# Patient Record
Sex: FEMALE | Race: BLACK OR AFRICAN AMERICAN | URBAN - METROPOLITAN AREA
[De-identification: names, ages, dates, MRNs, and addresses within clinical notes are randomized per-mention and may not be internally consistent; named-entity substitution may affect disease eponyms.]

---

## 2023-10-12 ENCOUNTER — CLAIMS CREATED FROM THE CLAIM WINDOW (OUTPATIENT)
Dept: URBAN - METROPOLITAN AREA CLINIC 105 | Facility: CLINIC | Age: 48
End: 2023-10-12
Payer: SELF-PAY

## 2023-10-12 ENCOUNTER — TELEPHONE ENCOUNTER (OUTPATIENT)
Dept: URBAN - METROPOLITAN AREA CLINIC 105 | Facility: CLINIC | Age: 48
End: 2023-10-12

## 2023-10-12 VITALS
HEART RATE: 98 BPM | BODY MASS INDEX: 32.47 KG/M2 | TEMPERATURE: 97.3 F | DIASTOLIC BLOOD PRESSURE: 79 MMHG | HEIGHT: 66 IN | SYSTOLIC BLOOD PRESSURE: 125 MMHG | WEIGHT: 202 LBS

## 2023-10-12 DIAGNOSIS — K57.92 DIVERTICULITIS: ICD-10-CM

## 2023-10-12 DIAGNOSIS — Z12.11 COLON CANCER SCREENING: ICD-10-CM

## 2023-10-12 PROCEDURE — 99203 OFFICE O/P NEW LOW 30 MIN: CPT

## 2023-10-12 PROCEDURE — 99203 OFFICE O/P NEW LOW 30 MIN: CPT | Performed by: INTERNAL MEDICINE

## 2023-10-12 RX ORDER — AMOXICILLIN 875 MG/1
1 TABLET TABLET, FILM COATED ORAL TWICE A DAY
Status: ACTIVE | COMMUNITY

## 2023-10-12 RX ORDER — CIPROFLOXACIN HYDROCHLORIDE 500 MG/1
1 TABLET TABLET, FILM COATED ORAL
Qty: 28 TABLET | Refills: 0 | OUTPATIENT
Start: 2023-10-12 | End: 2023-10-26

## 2023-10-12 RX ORDER — METRONIDAZOLE 500 MG/1
1 TABLET TABLET ORAL THREE TIMES A DAY
Qty: 42 TABLET | Refills: 0 | OUTPATIENT
Start: 2023-10-12 | End: 2023-10-26

## 2023-10-12 NOTE — HPI-TODAY'S VISIT:
New patient here with no PMH hx here as a hospital follow up for diverticulitis. Patient states this has never happened before. She has never had a colonosocpy before.  Went to [facility] ER 10/12/2023 for LLQ severe abdominal pain. Intermittment. Exacerbated while laying on left side and on back. Radiated to her back. Had nausea and diarrhea. Had 1-2 BMs that were soft. Denies vomiting Had LLQ tenderess at PCP office who recommended she go to the ER on 10/11/2023. Labs showed WBC 14.3. CT scan showed acute uncomplicated sigmoid diverticulitis.   Was discharged with Amoxicillin 875 mg PO BID. Patient states she could not take medicaiont becuse it was too big. Patient still having pain today. Same severeity. Still nauseous.   No prior history of constipation.

## 2023-10-17 ENCOUNTER — TELEPHONE ENCOUNTER (OUTPATIENT)
Dept: URBAN - METROPOLITAN AREA CLINIC 105 | Facility: CLINIC | Age: 48
End: 2023-10-17

## 2023-10-23 ENCOUNTER — CLAIMS CREATED FROM THE CLAIM WINDOW (OUTPATIENT)
Dept: URBAN - METROPOLITAN AREA CLINIC 105 | Facility: CLINIC | Age: 48
End: 2023-10-23
Payer: SELF-PAY

## 2023-10-23 VITALS
TEMPERATURE: 97.3 F | SYSTOLIC BLOOD PRESSURE: 126 MMHG | WEIGHT: 201.6 LBS | HEIGHT: 66 IN | BODY MASS INDEX: 32.4 KG/M2 | DIASTOLIC BLOOD PRESSURE: 88 MMHG | HEART RATE: 73 BPM

## 2023-10-23 DIAGNOSIS — Z12.11 COLON CANCER SCREENING: ICD-10-CM

## 2023-10-23 DIAGNOSIS — K57.92 DIVERTICULITIS: ICD-10-CM

## 2023-10-23 PROCEDURE — 99213 OFFICE O/P EST LOW 20 MIN: CPT

## 2023-10-23 PROCEDURE — 99213 OFFICE O/P EST LOW 20 MIN: CPT | Performed by: INTERNAL MEDICINE

## 2023-10-23 RX ORDER — CIPROFLOXACIN HYDROCHLORIDE 500 MG/1
1 TABLET TABLET, FILM COATED ORAL
Qty: 28 TABLET | Refills: 0 | Status: ACTIVE | COMMUNITY
Start: 2023-10-12 | End: 2023-10-26

## 2023-10-23 RX ORDER — METRONIDAZOLE 500 MG/1
1 TABLET TABLET ORAL THREE TIMES A DAY
Qty: 42 TABLET | Refills: 0 | Status: ACTIVE | COMMUNITY
Start: 2023-10-12 | End: 2023-10-26

## 2023-10-23 RX ORDER — AMOXICILLIN 875 MG/1
1 TABLET TABLET, FILM COATED ORAL TWICE A DAY
Status: ON HOLD | COMMUNITY

## 2023-10-23 NOTE — HPI-TODAY'S VISIT:
10/23/2023 Patient is here as a follow-up visit for diverticulitis. She was prescribed Cipro 500 BID and Flagyl 500 mg TID by me on 10/12/2023. States she has been taking it as prescribed for the past 11 days and only missed one dose. She has 3 days left of both medications. She states her symptoms are resolving, and she appears to be in better condition than she was at the last visit. She reports no active LLQ abdominal pain today. She is having 3 formed BMs a day. They were loose stools at last visit. It typically happens after meals. Denies fever, chills, n/v, vomiting, and rectal bleeding.

## 2023-10-23 NOTE — HPI-OTHER HISTORIES
10/12/2023 New patient here with no PMH hx here as a hospital follow up for diverticulitis. Patient states this has never happened before. She has never had a colonosocpy before.  Went to [facility] ER 10/12/2023 for LLQ severe abdominal pain. Intermittment. Exacerbated while laying on left side and on back. Radiated to her back. Had nausea and diarrhea. Had 1-2 BMs that were soft. Denies vomiting Had LLQ tenderess at PCP office who recommended she go to the ER on 10/11/2023. Labs showed WBC 14.3. CT scan showed acute uncomplicated sigmoid diverticulitis.   Was discharged with Amoxicillin 875 mg PO BID. Patient states she could not take medicaiont becuse it was too big. Patient still having pain today. Same severeity. Still nauseous.   No prior history of constipation.

## 2023-10-24 LAB
ABSOLUTE BASOPHILS: 75
ABSOLUTE EOSINOPHILS: 161
ABSOLUTE LYMPHOCYTES: 3702
ABSOLUTE MONOCYTES: 974
ABSOLUTE NEUTROPHILS: 5789
BASOPHILS: 0.7
EOSINOPHILS: 1.5
HEMATOCRIT: 41
HEMOGLOBIN: 13.4
LYMPHOCYTES: 34.6
MCH: 29.2
MCHC: 32.7
MCV: 89.3
MONOCYTES: 9.1
MPV: 9.1
NEUTROPHILS: 54.1
PLATELET COUNT: 357
RDW: 13
RED BLOOD CELL COUNT: 4.59
WHITE BLOOD CELL COUNT: 10.7

## 2024-01-08 ENCOUNTER — DASHBOARD ENCOUNTERS (OUTPATIENT)
Age: 49
End: 2024-01-08

## 2024-01-08 ENCOUNTER — LAB OUTSIDE AN ENCOUNTER (OUTPATIENT)
Dept: URBAN - METROPOLITAN AREA CLINIC 105 | Facility: CLINIC | Age: 49
End: 2024-01-08

## 2024-01-08 ENCOUNTER — OFFICE VISIT (OUTPATIENT)
Dept: URBAN - METROPOLITAN AREA CLINIC 105 | Facility: CLINIC | Age: 49
End: 2024-01-08
Payer: SELF-PAY

## 2024-01-08 VITALS
TEMPERATURE: 97.3 F | HEART RATE: 76 BPM | DIASTOLIC BLOOD PRESSURE: 82 MMHG | BODY MASS INDEX: 32.43 KG/M2 | HEIGHT: 66 IN | WEIGHT: 201.8 LBS | SYSTOLIC BLOOD PRESSURE: 144 MMHG

## 2024-01-08 DIAGNOSIS — R05.8 DRY COUGH: ICD-10-CM

## 2024-01-08 DIAGNOSIS — K59.09 OTHER CONSTIPATION: ICD-10-CM

## 2024-01-08 DIAGNOSIS — R15.2 FECAL URGENCY: ICD-10-CM

## 2024-01-08 DIAGNOSIS — Z12.11 COLON CANCER SCREENING: ICD-10-CM

## 2024-01-08 PROBLEM — 305058001: Status: ACTIVE | Noted: 2024-01-08

## 2024-01-08 PROBLEM — 162864005: Status: ACTIVE | Noted: 2024-01-08

## 2024-01-08 PROBLEM — 14760008: Status: ACTIVE | Noted: 2024-01-08

## 2024-01-08 PROBLEM — 307496006: Status: ACTIVE | Noted: 2023-10-12

## 2024-01-08 PROCEDURE — 99213 OFFICE O/P EST LOW 20 MIN: CPT | Performed by: INTERNAL MEDICINE

## 2024-01-08 RX ORDER — AMOXICILLIN 875 MG/1
1 TABLET TABLET, FILM COATED ORAL TWICE A DAY
Status: ON HOLD | COMMUNITY

## 2024-01-08 RX ORDER — OMEPRAZOLE 40 MG/1
1 CAPSULE 30 MINUTES BEFORE MORNING MEAL CAPSULE, DELAYED RELEASE ORAL ONCE A DAY
Qty: 30 | Refills: 3 | OUTPATIENT
Start: 2024-01-08

## 2024-01-08 RX ORDER — POLYETHYLENE GLYCOL-3350 AND ELECTROLYTES 236; 6.74; 5.86; 2.97; 22.74 G/274.31G; G/274.31G; G/274.31G; G/274.31G; G/274.31G
4000ML POWDER, FOR SOLUTION ORAL 1
Qty: 1 | Refills: 0 | OUTPATIENT
Start: 2024-01-08 | End: 2024-01-09

## 2024-01-08 RX ORDER — OMEPRAZOLE 40 MG/1
1 CAPSULE 30 MINUTES BEFORE MORNING MEAL CAPSULE, DELAYED RELEASE ORAL ONCE A DAY
Status: ACTIVE | COMMUNITY

## 2024-01-08 NOTE — HPI-TODAY'S VISIT:
10/12/2023 New patient here with no PMH hx here as a hospital follow up for diverticulitis. Patient states this has never happened before. She has never had a colonoscopy before.  Went to [place] ER 10/12/2023 for LLQ severe abdominal pain. Intermittent. Exacerbated while laying on left side and on back. Radiated to her back. Had nausea and diarrhea. Had 1-2 BMs that were soft. Denies vomiting Had LLQ tenderness at PCP office who recommended she go to the ER on 10/11/2023. Labs showed WBC 14.3. CT scan showed acute uncomplicated sigmoid diverticulitis.  Was discharged with Amoxicillin 875 mg PO BID. Patient states she could not take medication because it was too big. Patient still having pain today. Same severity. Still nauseous.  No prior history of constipation.  10/23/2023 Patient is here as a follow-up visit for diverticulitis. She was prescribed Cipro 500 BID and Flagyl 500 mg TID by me on 10/12/2023. States she has been taking it as prescribed for the past 11 days and only missed one dose. She has 3 days left of both medications. She states her symptoms are resolving, and she appears to be in better condition than she was at the last visit. She reports no active LLQ abdominal pain today. She is having 3 formed BMs a day. They were loose stools at last visit. It typically happens after meals. Denies fever, chills, n/v, vomiting, and rectal bleeding.  01/08/2023 patient here as a follow up. She reports having formed stools daily 2-3 times. It is post-prandial and she states she has some urgency. Denies fecal incontinence, rectal bleeding and abdominal pain. Admits to "stomach fluttering". She reports incorporating healthier food options in her diet.  Denies straining. Admits to incomplete evacuation. She also had a dry cough spell that she went to see a pulmonologist about last month. She was placed on Omeprazole 40 mg and has a follow up next month. She states she is almost out of medication.

## 2024-04-02 ENCOUNTER — COLON (OUTPATIENT)
Dept: URBAN - METROPOLITAN AREA SURGERY CENTER 16 | Facility: SURGERY CENTER | Age: 49
End: 2024-04-02

## 2024-04-29 ENCOUNTER — OV EP (OUTPATIENT)
Dept: URBAN - METROPOLITAN AREA CLINIC 17 | Facility: CLINIC | Age: 49
End: 2024-04-29

## 2024-04-30 ENCOUNTER — COLON (OUTPATIENT)
Dept: URBAN - METROPOLITAN AREA SURGERY CENTER 16 | Facility: SURGERY CENTER | Age: 49
End: 2024-04-30

## 2024-04-30 RX ORDER — OMEPRAZOLE 40 MG/1
1 CAPSULE 30 MINUTES BEFORE MORNING MEAL CAPSULE, DELAYED RELEASE ORAL ONCE A DAY
Status: ACTIVE | COMMUNITY

## 2024-04-30 RX ORDER — OMEPRAZOLE 40 MG/1
1 CAPSULE 30 MINUTES BEFORE MORNING MEAL CAPSULE, DELAYED RELEASE ORAL ONCE A DAY
Qty: 30 | Refills: 3 | Status: ACTIVE | COMMUNITY
Start: 2024-01-08

## 2024-04-30 RX ORDER — AMOXICILLIN 875 MG/1
1 TABLET TABLET, FILM COATED ORAL TWICE A DAY
Status: ON HOLD | COMMUNITY

## 2024-05-23 ENCOUNTER — OFFICE VISIT (OUTPATIENT)
Dept: URBAN - METROPOLITAN AREA CLINIC 105 | Facility: CLINIC | Age: 49
End: 2024-05-23
Payer: SELF-PAY

## 2024-05-23 DIAGNOSIS — R05.8 DRY COUGH: ICD-10-CM

## 2024-05-23 DIAGNOSIS — K59.09 OTHER CONSTIPATION: ICD-10-CM

## 2024-05-23 PROCEDURE — 99214 OFFICE O/P EST MOD 30 MIN: CPT | Performed by: INTERNAL MEDICINE

## 2024-05-23 RX ORDER — OMEPRAZOLE 40 MG/1
1 CAPSULE 30 MINUTES BEFORE MORNING MEAL CAPSULE, DELAYED RELEASE ORAL ONCE A DAY
Qty: 30 | Refills: 3 | Status: ACTIVE | COMMUNITY
Start: 2024-01-08

## 2024-05-23 RX ORDER — OMEPRAZOLE 20 MG/1
1 CAPSULE 30 MINUTES BEFORE MORNING MEAL CAPSULE, DELAYED RELEASE ORAL ONCE A DAY
Qty: 30 | Refills: 3 | OUTPATIENT

## 2024-07-22 ENCOUNTER — TELEPHONE ENCOUNTER (OUTPATIENT)
Dept: URBAN - METROPOLITAN AREA CLINIC 105 | Facility: CLINIC | Age: 49
End: 2024-07-22

## 2024-07-22 RX ORDER — AMOXICILLIN AND CLAVULANATE POTASSIUM 875; 125 MG/1; MG/1
1 TABLET TABLET, FILM COATED ORAL EVERY 8 HOURS
Qty: 30 TABLET | Refills: 0 | OUTPATIENT
Start: 2024-07-23 | End: 2024-08-02

## 2024-07-23 ENCOUNTER — TELEPHONE ENCOUNTER (OUTPATIENT)
Dept: URBAN - METROPOLITAN AREA CLINIC 105 | Facility: CLINIC | Age: 49
End: 2024-07-23

## 2024-07-23 RX ORDER — CIPROFLOXACIN 500 MG/1
1 TABLET TABLET, FILM COATED ORAL
Qty: 20 TABLET | Refills: 0 | OUTPATIENT
Start: 2024-07-23 | End: 2024-08-02

## 2024-07-23 RX ORDER — METRONIDAZOLE 500 MG/1
1 TABLET TABLET ORAL THREE TIMES A DAY
Qty: 30 | Refills: 0 | OUTPATIENT
Start: 2024-07-23 | End: 2024-08-02

## 2024-07-24 ENCOUNTER — OFFICE VISIT (OUTPATIENT)
Dept: URBAN - METROPOLITAN AREA CLINIC 105 | Facility: CLINIC | Age: 49
End: 2024-07-24
Payer: SELF-PAY

## 2024-07-24 VITALS
BODY MASS INDEX: 32.53 KG/M2 | DIASTOLIC BLOOD PRESSURE: 80 MMHG | HEART RATE: 73 BPM | TEMPERATURE: 97.2 F | HEIGHT: 66 IN | WEIGHT: 202.4 LBS | SYSTOLIC BLOOD PRESSURE: 123 MMHG

## 2024-07-24 DIAGNOSIS — K57.32 CECAL DIVERTICULITIS: ICD-10-CM

## 2024-07-24 PROBLEM — 735593008: Status: ACTIVE | Noted: 2024-07-24

## 2024-07-24 PROCEDURE — 99214 OFFICE O/P EST MOD 30 MIN: CPT | Performed by: INTERNAL MEDICINE

## 2024-07-24 RX ORDER — METRONIDAZOLE 500 MG/1
1 TABLET TABLET ORAL THREE TIMES A DAY
Qty: 30 | Refills: 0 | OUTPATIENT

## 2024-07-24 RX ORDER — CIPROFLOXACIN 500 MG/1
1 TABLET TABLET, FILM COATED ORAL
Qty: 20 TABLET | Refills: 0 | OUTPATIENT

## 2024-07-24 RX ORDER — METRONIDAZOLE 500 MG/1
1 TABLET TABLET ORAL THREE TIMES A DAY
Qty: 30 | Refills: 0 | Status: ACTIVE | COMMUNITY
Start: 2024-07-23 | End: 2024-08-02

## 2024-07-24 RX ORDER — CIPROFLOXACIN 500 MG/1
1 TABLET TABLET, FILM COATED ORAL
Qty: 20 TABLET | Refills: 0 | Status: ACTIVE | COMMUNITY
Start: 2024-07-23 | End: 2024-08-02

## 2024-07-24 RX ORDER — OMEPRAZOLE 20 MG/1
1 CAPSULE 30 MINUTES BEFORE MORNING MEAL CAPSULE, DELAYED RELEASE ORAL ONCE A DAY
Qty: 30 | Refills: 3 | Status: ACTIVE | COMMUNITY

## 2024-07-24 RX ORDER — AMOXICILLIN AND CLAVULANATE POTASSIUM 875; 125 MG/1; MG/1
1 TABLET TABLET, FILM COATED ORAL EVERY 8 HOURS
Qty: 30 TABLET | Refills: 0 | Status: ON HOLD | COMMUNITY
Start: 2024-07-23 | End: 2024-08-02

## 2024-07-24 NOTE — HPI-TODAY'S VISIT:
10/12/2023 New patient here with no PMH hx here as a hospital follow up for diverticulitis. Patient states this has never happened before. She has never had a colonoscopy before.  Went to [place] ER 10/12/2023 for LLQ severe abdominal pain. Intermittent. Exacerbated while laying on left side and on back. Radiated to her back. Had nausea and diarrhea. Had 1-2 BMs that were soft. Denies vomiting Had LLQ tenderness at PCP office who recommended she go to the ER on 10/11/2023. Labs showed WBC 14.3. CT scan showed acute uncomplicated sigmoid diverticulitis.  Was discharged with Amoxicillin 875 mg PO BID. Patient states she could not take medication because it was too big. Patient still having pain today. Same severity. Still nauseous.  No prior history of constipation.  10/23/2023 Patient is here as a follow-up visit for diverticulitis. She was prescribed Cipro 500 BID and Flagyl 500 mg TID by me on 10/12/2023. States she has been taking it as prescribed for the past 11 days and only missed one dose. She has 3 days left of both medications. She states her symptoms are resolving, and she appears to be in better condition than she was at the last visit. She reports no active LLQ abdominal pain today. She is having 3 formed BMs a day. They were loose stools at last visit. It typically happens after meals. Denies fever, chills, n/v, vomiting, and rectal bleeding.  01/08/2024 patient here as a follow up. She reports having formed stools daily 2-3 times. It is post-prandial and she states she has some urgency. Denies fecal incontinence, rectal bleeding and abdominal pain. Admits to "stomach fluttering". She reports incorporating healthier food options in her diet.  Denies straining. Admits to incomplete evacuation. She also had a dry cough spell that she went to see a pulmonologist about last month. She was placed on Omeprazole 40 mg and has a follow up next month. She states she is almost out of medication.  05/23/2024 Pt presents for f/u after colonoscopy. At last visit, she was continued on omeprazole 40 mg daily and was recommended to trial Benefiber/MiraLax for suspected constipation with overflow. Pt states she is doing well without bowel regimen. Typically has BM after she eats without straining.  She has continued on omeprazole 40 mg daily with some improvement in cough. She notes that some days she still has the cough, but less frequent. Denies heartburn, regurgitation, N/V.  07/24/2024 Pt presents to f/u on diverticulitis. She called in 7/23/24 with LLQ pain similar to previous episode of diverticulitis. With CT-confirmed history of diverticulitis in 10/2023, pt was empirically started on Augmentin; however pt noted that she reacted to amoxicillin in the past and so was sent Cipro + Flagyl instead. She was recommended bowel rest and OV f/u. Today, pt states symptoms started 2 days ago. She suddenly had pain in LLQ and across lower abdomen. Thought initially might be gas pains or constipation. She realized symptoms were similar to previous episode of diverticulitis. Pain waxed and waned, but eventually she decided to go to ED. She did not end up going into the ED. She took Tylenol throughout the night. She started taking antibiotics last night.  he admits to some lower appetite with symptoms. Denies associated fever, chills, N/V, rectal bleeding, diarrhea. Last BM was this morning.  She thinks she was having regular BMs before symptoms started; she did not feel constipated. She stopped taking the Benefiber since her colonoscopy because she thought she was told to stop it. She does not take any NSAIDs. She has not been taking anything for this episode of pain.

## 2024-07-24 NOTE — PHYSICAL EXAM GASTROINTESTINAL
Abdomen soft, tender in lower abdomen worse in LLQ, nondistended, no guarding or rigidity, no masses palpable, normal bowel sounds Liver and Spleen no hepatosplenomegaly Rectal deferred

## 2024-07-25 LAB
A/G RATIO: 1.6
ABSOLUTE BASOPHILS: 58
ABSOLUTE EOSINOPHILS: 110
ABSOLUTE LYMPHOCYTES: 2613
ABSOLUTE MONOCYTES: 847
ABSOLUTE NEUTROPHILS: 3672
ALBUMIN: 4.5
ALKALINE PHOSPHATASE: 83
ALT (SGPT): 21
AST (SGOT): 20
BASOPHILS: 0.8
BILIRUBIN, TOTAL: 0.4
BUN/CREATININE RATIO: (no result)
BUN: 9
CALCIUM: 10.3
CARBON DIOXIDE, TOTAL: 27
CHLORIDE: 107
CREATININE: 0.76
EGFR: 97
EOSINOPHILS: 1.5
GLOBULIN, TOTAL: 2.9
GLUCOSE: 83
HEMATOCRIT: 38.1
HEMOGLOBIN: 13.2
LIPASE: 22
LYMPHOCYTES: 35.8
MCH: 30.3
MCHC: 34.6
MCV: 87.4
MONOCYTES: 11.6
MPV: 9.8
NEUTROPHILS: 50.3
PLATELET COUNT: 275
POTASSIUM: 4.9
PROTEIN, TOTAL: 7.4
RDW: 13
RED BLOOD CELL COUNT: 4.36
SODIUM: 143
WHITE BLOOD CELL COUNT: 7.3

## 2024-08-05 ENCOUNTER — OFFICE VISIT (OUTPATIENT)
Dept: URBAN - METROPOLITAN AREA CLINIC 105 | Facility: CLINIC | Age: 49
End: 2024-08-05

## 2024-08-07 ENCOUNTER — OFFICE VISIT (OUTPATIENT)
Dept: URBAN - METROPOLITAN AREA CLINIC 105 | Facility: CLINIC | Age: 49
End: 2024-08-07
Payer: SELF-PAY

## 2024-08-07 VITALS
DIASTOLIC BLOOD PRESSURE: 89 MMHG | HEIGHT: 66 IN | WEIGHT: 202 LBS | TEMPERATURE: 98.1 F | HEART RATE: 103 BPM | SYSTOLIC BLOOD PRESSURE: 149 MMHG | BODY MASS INDEX: 32.47 KG/M2

## 2024-08-07 DIAGNOSIS — K59.01 CONSTIPATION: ICD-10-CM

## 2024-08-07 DIAGNOSIS — R05.8 DRY COUGH: ICD-10-CM

## 2024-08-07 PROCEDURE — 99214 OFFICE O/P EST MOD 30 MIN: CPT | Performed by: INTERNAL MEDICINE

## 2024-08-07 RX ORDER — CIPROFLOXACIN 500 MG/1
1 TABLET TABLET, FILM COATED ORAL
Qty: 20 TABLET | Refills: 0 | COMMUNITY

## 2024-08-07 RX ORDER — OMEPRAZOLE 20 MG/1
1 CAPSULE 30 MINUTES BEFORE MORNING MEAL CAPSULE, DELAYED RELEASE ORAL ONCE A DAY
OUTPATIENT

## 2024-08-07 RX ORDER — OMEPRAZOLE 20 MG/1
1 CAPSULE 30 MINUTES BEFORE MORNING MEAL CAPSULE, DELAYED RELEASE ORAL ONCE A DAY
Qty: 30 | Refills: 3 | Status: ACTIVE | COMMUNITY

## 2024-08-07 RX ORDER — METRONIDAZOLE 500 MG/1
1 TABLET TABLET ORAL THREE TIMES A DAY
Qty: 30 | Refills: 0 | COMMUNITY

## 2024-08-08 LAB — TSH W/REFLEX TO FT4: 1.13

## 2024-11-07 ENCOUNTER — OFFICE VISIT (OUTPATIENT)
Dept: URBAN - METROPOLITAN AREA CLINIC 105 | Facility: CLINIC | Age: 49
End: 2024-11-07

## 2024-11-13 ENCOUNTER — OFFICE VISIT (OUTPATIENT)
Dept: URBAN - METROPOLITAN AREA CLINIC 105 | Facility: CLINIC | Age: 49
End: 2024-11-13
Payer: SELF-PAY

## 2024-11-13 VITALS
DIASTOLIC BLOOD PRESSURE: 89 MMHG | HEIGHT: 66 IN | HEART RATE: 92 BPM | WEIGHT: 208 LBS | SYSTOLIC BLOOD PRESSURE: 134 MMHG | TEMPERATURE: 97.5 F | BODY MASS INDEX: 33.43 KG/M2

## 2024-11-13 DIAGNOSIS — K64.4 HEMORRHOIDS, EXTERNAL: ICD-10-CM

## 2024-11-13 DIAGNOSIS — K64.8 HEMORRHOIDS, INTERNAL: ICD-10-CM

## 2024-11-13 DIAGNOSIS — R05.8 DRY COUGH: ICD-10-CM

## 2024-11-13 DIAGNOSIS — Z12.11 COLON CANCER SCREENING: ICD-10-CM

## 2024-11-13 DIAGNOSIS — K57.92 ACUTE DIVERTICULITIS: ICD-10-CM

## 2024-11-13 DIAGNOSIS — R10.9 ABDOMINAL DISCOMFORT: ICD-10-CM

## 2024-11-13 DIAGNOSIS — K59.00 CONSTIPATION, UNSPECIFIED CONSTIPATION TYPE: ICD-10-CM

## 2024-11-13 DIAGNOSIS — R15.2 FECAL URGENCY: ICD-10-CM

## 2024-11-13 PROCEDURE — 99213 OFFICE O/P EST LOW 20 MIN: CPT

## 2024-11-13 RX ORDER — METRONIDAZOLE 500 MG/1
1 TABLET TABLET ORAL THREE TIMES A DAY
Qty: 30 | Refills: 0 | COMMUNITY

## 2024-11-13 RX ORDER — OMEPRAZOLE 20 MG/1
1 CAPSULE 30 MINUTES BEFORE MORNING MEAL CAPSULE, DELAYED RELEASE ORAL ONCE A DAY
Status: ACTIVE | COMMUNITY

## 2024-11-13 RX ORDER — OMEPRAZOLE 20 MG/1
1 CAPSULE 1/2 TO 1 HOUR BEFORE MORNING MEAL CAPSULE, DELAYED RELEASE ORAL ONCE A DAY
Qty: 90 | Refills: 3

## 2024-11-13 RX ORDER — CIPROFLOXACIN 500 MG/1
1 TABLET TABLET, FILM COATED ORAL
Qty: 20 TABLET | Refills: 0 | COMMUNITY

## 2024-11-13 NOTE — HPI-TODAY'S VISIT:
10/12/2023 New patient here with no PMH hx here as a hospital follow up for diverticulitis. Patient states this has never happened before. She has never had a colonoscopy before.  Went to [place] ER 10/12/2023 for LLQ severe abdominal pain. Intermittent. Exacerbated while laying on left side and on back. Radiated to her back. Had nausea and diarrhea. Had 1-2 BMs that were soft. Denies vomiting Had LLQ tenderness at PCP office who recommended she go to the ER on 10/11/2023. Labs showed WBC 14.3. CT scan showed acute uncomplicated sigmoid diverticulitis.  Was discharged with Amoxicillin 875 mg PO BID. Patient states she could not take medication because it was too big. Patient still having pain today. Same severity. Still nauseous.  No prior history of constipation.  10/23/2023 Patient is here as a follow-up visit for diverticulitis. She was prescribed Cipro 500 BID and Flagyl 500 mg TID by me on 10/12/2023. States she has been taking it as prescribed for the past 11 days and only missed one dose. She has 3 days left of both medications. She states her symptoms are resolving, and she appears to be in better condition than she was at the last visit. She reports no active LLQ abdominal pain today. She is having 3 formed BMs a day. They were loose stools at last visit. It typically happens after meals. Denies fever, chills, n/v, vomiting, and rectal bleeding.  01/08/2024 patient here as a follow up. She reports having formed stools daily 2-3 times. It is post-prandial and she states she has some urgency. Denies fecal incontinence, rectal bleeding and abdominal pain. Admits to "stomach fluttering". She reports incorporating healthier food options in her diet.  Denies straining. Admits to incomplete evacuation. She also had a dry cough spell that she went to see a pulmonologist about last month. She was placed on Omeprazole 40 mg and has a follow up next month. She states she is almost out of medication.  05/23/2024 Pt presents for f/u after colonoscopy. At last visit, she was continued on omeprazole 40 mg daily and was recommended to trial Benefiber/MiraLax for suspected constipation with overflow. Pt states she is doing well without bowel regimen. Typically has BM after she eats without straining.  She has continued on omeprazole 40 mg daily with some improvement in cough. She notes that some days she still has the cough, but less frequent. Denies heartburn, regurgitation, N/V.  07/24/2024 Pt presents to f/u on diverticulitis. She called in 7/23/24 with LLQ pain similar to previous episode of diverticulitis. With CT-confirmed history of diverticulitis in 10/2023, pt was empirically started on Augmentin; however pt noted that she reacted to amoxicillin in the past and so was sent Cipro + Flagyl instead. She was recommended bowel rest and OV f/u. Today, pt states symptoms started 2 days ago. She suddenly had pain in LLQ and across lower abdomen. Thought initially might be gas pains or constipation. She realized symptoms were similar to previous episode of diverticulitis. Pain waxed and waned, but eventually she decided to go to ED. She did not end up going into the ED. She took Tylenol throughout the night. She started taking antibiotics last night.  She admits to some lower appetite with symptoms. Denies associated fever, chills, N/V, rectal bleeding, diarrhea. Last BM was this morning.  She thinks she was having regular BMs before symptoms started; she did not feel constipated. She stopped taking the Benefiber since her colonoscopy because she thought she was told to stop it. She does not take any NSAIDs. She has not been taking anything for this episode of pain.  08/07/2024 Pt presents for f/u. At last visit, she was continued on cipro + Flagyl and recommended bowel rest. CMP, CBC, lipase ordered and normal. Restarting Benefiber and high fiber diet was recommended to prevent future episodes. Today, pt states her stomach is still "unsettles." She had some diarrhea on the antibiotics. No longer having pain. When she eats, she still feels the urge to have a BM. This happened last time she took antibiotics as well.  She started back on fiber supplement this week. She is drinking water throughout the day.  She started having the cough again.  She is concerned about her thyroid because of her abdomen feeling "tight." Has noticed some hair loss as well. Would like to check this today.  11/13/2024 Pt presents for f/u. At last visit, she was recommended to add probiotic and continue fiber supplement with increased water intake. She was recommended to monitor cough and consider restarting omeprazole 20 mg daily, if persisting. TSH ordered and normal.  Today, pt states she hasn't been bad. States every now and then she had slight feelings of abdominal discomfort, but pain doesn't come. Has not identified food triggers. She thought milk was the problem but changing to oat milk didn't help. States BMs are doing well; she is doing probiotic and fiber supplement.  States cough comes and goes, but mostly not an issue. She is taking omeprazole 20 mg daily and needs refill.

## 2024-11-13 NOTE — PHYSICAL EXAM CONSTITUTIONAL:
well developed, well nourished, in no acute distress, ambulating without difficulty, normal communication ability Current every day smoker

## 2024-12-05 ENCOUNTER — OFFICE VISIT (OUTPATIENT)
Dept: URBAN - METROPOLITAN AREA TELEHEALTH 2 | Facility: TELEHEALTH | Age: 49
End: 2024-12-05
Payer: SELF-PAY

## 2024-12-05 DIAGNOSIS — K58.1 IRRITABLE BOWEL SYNDROME WITH CONSTIPATION: ICD-10-CM

## 2024-12-05 PROCEDURE — 97802 MEDICAL NUTRITION INDIV IN: CPT | Performed by: DIETITIAN, REGISTERED

## 2024-12-05 RX ORDER — OMEPRAZOLE 20 MG/1
1 CAPSULE 1/2 TO 1 HOUR BEFORE MORNING MEAL CAPSULE, DELAYED RELEASE ORAL ONCE A DAY
Qty: 90 | Refills: 3 | Status: ACTIVE | COMMUNITY

## 2024-12-05 RX ORDER — METRONIDAZOLE 500 MG/1
1 TABLET TABLET ORAL THREE TIMES A DAY
Qty: 30 | Refills: 0 | COMMUNITY

## 2024-12-05 RX ORDER — CIPROFLOXACIN 500 MG/1
1 TABLET TABLET, FILM COATED ORAL
Qty: 20 TABLET | Refills: 0 | COMMUNITY

## 2025-05-13 ENCOUNTER — OFFICE VISIT (OUTPATIENT)
Dept: URBAN - METROPOLITAN AREA CLINIC 105 | Facility: CLINIC | Age: 50
End: 2025-05-13

## 2025-05-13 NOTE — HPI-TODAY'S VISIT:
10/12/2023 New patient here with no PMH hx here as a hospital follow up for diverticulitis. Patient states this has never happened before. She has never had a colonoscopy before.  Went to [place] ER 10/12/2023 for LLQ severe abdominal pain. Intermittent. Exacerbated while laying on left side and on back. Radiated to her back. Had nausea and diarrhea. Had 1-2 BMs that were soft. Denies vomiting Had LLQ tenderness at PCP office who recommended she go to the ER on 10/11/2023. Labs showed WBC 14.3. CT scan showed acute uncomplicated sigmoid diverticulitis.  Was discharged with Amoxicillin 875 mg PO BID. Patient states she could not take medication because it was too big. Patient still having pain today. Same severity. Still nauseous.  No prior history of constipation.  10/23/2023 Patient is here as a follow-up visit for diverticulitis. She was prescribed Cipro 500 BID and Flagyl 500 mg TID by me on 10/12/2023. States she has been taking it as prescribed for the past 11 days and only missed one dose. She has 3 days left of both medications. She states her symptoms are resolving, and she appears to be in better condition than she was at the last visit. She reports no active LLQ abdominal pain today. She is having 3 formed BMs a day. They were loose stools at last visit. It typically happens after meals. Denies fever, chills, n/v, vomiting, and rectal bleeding.  01/08/2024 patient here as a follow up. She reports having formed stools daily 2-3 times. It is post-prandial and she states she has some urgency. Denies fecal incontinence, rectal bleeding and abdominal pain. Admits to "stomach fluttering". She reports incorporating healthier food options in her diet.  Denies straining. Admits to incomplete evacuation. She also had a dry cough spell that she went to see a pulmonologist about last month. She was placed on Omeprazole 40 mg and has a follow up next month. She states she is almost out of medication.  05/23/2024 Pt presents for f/u after colonoscopy. At last visit, she was continued on omeprazole 40 mg daily and was recommended to trial Benefiber/MiraLax for suspected constipation with overflow. Pt states she is doing well without bowel regimen. Typically has BM after she eats without straining.  She has continued on omeprazole 40 mg daily with some improvement in cough. She notes that some days she still has the cough, but less frequent. Denies heartburn, regurgitation, N/V.  07/24/2024 Pt presents to f/u on diverticulitis. She called in 7/23/24 with LLQ pain similar to previous episode of diverticulitis. With CT-confirmed history of diverticulitis in 10/2023, pt was empirically started on Augmentin; however pt noted that she reacted to amoxicillin in the past and so was sent Cipro + Flagyl instead. She was recommended bowel rest and OV f/u. Today, pt states symptoms started 2 days ago. She suddenly had pain in LLQ and across lower abdomen. Thought initially might be gas pains or constipation. She realized symptoms were similar to previous episode of diverticulitis. Pain waxed and waned, but eventually she decided to go to ED. She did not end up going into the ED. She took Tylenol throughout the night. She started taking antibiotics last night.  She admits to some lower appetite with symptoms. Denies associated fever, chills, N/V, rectal bleeding, diarrhea. Last BM was this morning.  She thinks she was having regular BMs before symptoms started; she did not feel constipated. She stopped taking the Benefiber since her colonoscopy because she thought she was told to stop it. She does not take any NSAIDs. She has not been taking anything for this episode of pain.  08/07/2024 Pt presents for f/u. At last visit, she was continued on cipro + Flagyl and recommended bowel rest. CMP, CBC, lipase ordered and normal. Restarting Benefiber and high fiber diet was recommended to prevent future episodes. Today, pt states her stomach is still "unsettles." She had some diarrhea on the antibiotics. No longer having pain. When she eats, she still feels the urge to have a BM. This happened last time she took antibiotics as well.  She started back on fiber supplement this week. She is drinking water throughout the day.  She started having the cough again.  She is concerned about her thyroid because of her abdomen feeling "tight." Has noticed some hair loss as well. Would like to check this today.  11/13/2024 Pt presents for f/u. At last visit, she was recommended to add probiotic and continue fiber supplement with increased water intake. She was recommended to monitor cough and consider restarting omeprazole 20 mg daily, if persisting. TSH ordered and normal.  Today, pt states she hasn't been bad. States every now and then she had slight feelings of abdominal discomfort, but pain doesn't come. Has not identified food triggers. She thought milk was the problem but changing to oat milk didn't help. States BMs are doing well; she is doing probiotic and fiber supplement.  States cough comes and goes, but mostly not an issue. She is taking omeprazole 20 mg daily and needs refill.  05/13/2025 Pt presents for f/u. At last visit, she was to continue omeprazole 20 mg daily, probiotics, and fiber supplement. She was scheduled with dieticianStiven Martinez

## 2025-05-28 ENCOUNTER — OFFICE VISIT (OUTPATIENT)
Dept: URBAN - METROPOLITAN AREA CLINIC 105 | Facility: CLINIC | Age: 50
End: 2025-05-28
Payer: SELF-PAY

## 2025-05-28 DIAGNOSIS — Z12.11 COLON CANCER SCREENING: ICD-10-CM

## 2025-05-28 DIAGNOSIS — R10.9 ABDOMINAL DISCOMFORT: ICD-10-CM

## 2025-05-28 DIAGNOSIS — K59.00 CONSTIPATION, UNSPECIFIED CONSTIPATION TYPE: ICD-10-CM

## 2025-05-28 DIAGNOSIS — Z87.19 HISTORY OF DIVERTICULITIS: ICD-10-CM

## 2025-05-28 DIAGNOSIS — R05.8 DRY COUGH: ICD-10-CM

## 2025-05-28 DIAGNOSIS — R15.2 FECAL URGENCY: ICD-10-CM

## 2025-05-28 PROCEDURE — 99213 OFFICE O/P EST LOW 20 MIN: CPT

## 2025-05-28 RX ORDER — OMEPRAZOLE 20 MG/1
1 CAPSULE 1/2 TO 1 HOUR BEFORE MORNING MEAL CAPSULE, DELAYED RELEASE ORAL ONCE A DAY
Qty: 90 | Refills: 3
Start: 2025-05-28

## 2025-05-28 RX ORDER — OMEPRAZOLE 20 MG/1
1 CAPSULE 1/2 TO 1 HOUR BEFORE MORNING MEAL CAPSULE, DELAYED RELEASE ORAL ONCE A DAY
Qty: 90 | Refills: 3 | Status: ACTIVE | COMMUNITY

## 2025-05-28 NOTE — HPI-TODAY'S VISIT:
10/12/2023 New patient here with no PMH hx here as a hospital follow up for diverticulitis. Patient states this has never happened before. She has never had a colonoscopy before.  Went to [facility] 10/12/2023 for LLQ severe abdominal pain. Intermittent. Exacerbated while laying on left side and on back. Radiated to her back. Had nausea and diarrhea. Had 1-2 BMs that were soft. Denies vomiting Had LLQ tenderness at PCP office who recommended she go to the ER on 10/11/2023. Labs showed WBC 14.3. CT scan showed acute uncomplicated sigmoid diverticulitis.  Was discharged with Amoxicillin 875 mg PO BID. Patient states she could not take medication because it was too big. Patient still having pain today. Same severity. Still nauseous.  No prior history of constipation.  10/23/2023 Patient is here as a follow-up visit for diverticulitis. She was prescribed Cipro 500 BID and Flagyl 500 mg TID by me on 10/12/2023. States she has been taking it as prescribed for the past 11 days and only missed one dose. She has 3 days left of both medications. She states her symptoms are resolving, and she appears to be in better condition than she was at the last visit. She reports no active LLQ abdominal pain today. She is having 3 formed BMs a day. They were loose stools at last visit. It typically happens after meals. Denies fever, chills, n/v, vomiting, and rectal bleeding.  01/08/2024 patient here as a follow up. She reports having formed stools daily 2-3 times. It is post-prandial and she states she has some urgency. Denies fecal incontinence, rectal bleeding and abdominal pain. Admits to "stomach fluttering". She reports incorporating healthier food options in her diet.  Denies straining. Admits to incomplete evacuation. She also had a dry cough spell that she went to see a pulmonologist about last month. She was placed on Omeprazole 40 mg and has a follow up next month. She states she is almost out of medication.  05/23/2024 Pt presents for f/u after colonoscopy. At last visit, she was continued on omeprazole 40 mg daily and was recommended to trial Benefiber/MiraLax for suspected constipation with overflow. Pt states she is doing well without bowel regimen. Typically has BM after she eats without straining.  She has continued on omeprazole 40 mg daily with some improvement in cough. She notes that some days she still has the cough, but less frequent. Denies heartburn, regurgitation, N/V.  07/24/2024 Pt presents to f/u on diverticulitis. She called in 7/23/24 with LLQ pain similar to previous episode of diverticulitis. With CT-confirmed history of diverticulitis in 10/2023, pt was empirically started on Augmentin; however pt noted that she reacted to amoxicillin in the past and so was sent Cipro + Flagyl instead. She was recommended bowel rest and OV f/u. Today, pt states symptoms started 2 days ago. She suddenly had pain in LLQ and across lower abdomen. Thought initially might be gas pains or constipation. She realized symptoms were similar to previous episode of diverticulitis. Pain waxed and waned, but eventually she decided to go to ED. She did not end up going into the ED. She took Tylenol throughout the night. She started taking antibiotics last night.  She admits to some lower appetite with symptoms. Denies associated fever, chills, N/V, rectal bleeding, diarrhea. Last BM was this morning.  She thinks she was having regular BMs before symptoms started; she did not feel constipated. She stopped taking the Benefiber since her colonoscopy because she thought she was told to stop it. She does not take any NSAIDs. She has not been taking anything for this episode of pain.  08/07/2024 Pt presents for f/u. At last visit, she was continued on cipro + Flagyl and recommended bowel rest. CMP, CBC, lipase ordered and normal. Restarting Benefiber and high fiber diet was recommended to prevent future episodes. Today, pt states her stomach is still "unsettles." She had some diarrhea on the antibiotics. No longer having pain. When she eats, she still feels the urge to have a BM. This happened last time she took antibiotics as well.  She started back on fiber supplement this week. She is drinking water throughout the day.  She started having the cough again.  She is concerned about her thyroid because of her abdomen feeling "tight." Has noticed some hair loss as well. Would like to check this today.  11/13/2024 Pt presents for f/u. At last visit, she was recommended to add probiotic and continue fiber supplement with increased water intake. She was recommended to monitor cough and consider restarting omeprazole 20 mg daily, if persisting. TSH ordered and normal.  Today, pt states she hasn't been bad. States every now and then she had slight feelings of abdominal discomfort, but pain doesn't come. Has not identified food triggers. She thought milk was the problem but changing to oat milk didn't help. States BMs are doing well; she is doing probiotic and fiber supplement.  States cough comes and goes, but mostly not an issue. She is taking omeprazole 20 mg daily and needs refill.  05/28/2025 Pt presents for f/u. At last visit, she was to continue omeprazole 20 mg daily, probiotics, and fiber supplement. She was scheduled with dietician. Today, pt states she she has been doing okay. More recently she will occasionally feel some discomfort. Sometimes has need to have BM soon after eating. Has been trying to watch diet and admits it is sometimes not the best diet.  Having at least 2 BM/day. She feels some sensation of incomplete evacuation. She is still taking fiber supplement and probiotic most days. She thinks she drinks 2-3 20 oz bottles of water daily. Taking omeprazole 20 mg daily. Has breakthrough cough every once in a while but not like what it was. No breakthrough heartburn or regurgitation.  Juan